# Patient Record
Sex: FEMALE | URBAN - METROPOLITAN AREA
[De-identification: names, ages, dates, MRNs, and addresses within clinical notes are randomized per-mention and may not be internally consistent; named-entity substitution may affect disease eponyms.]

---

## 2017-05-26 ENCOUNTER — HISTORICAL (OUTPATIENT)
Dept: ADMINISTRATIVE | Facility: HOSPITAL | Age: 77
End: 2017-05-26

## 2017-05-26 LAB
ABS NEUT (OLG): 7.2 X10(3)/MCL
ALBUMIN SERPL-MCNC: 3.4 GM/DL (ref 3.4–5)
ALBUMIN/GLOB SERPL: 1 {RATIO}
ALP SERPL-CCNC: 118 UNIT/L (ref 45–117)
ALT SERPL-CCNC: 56 UNIT/L (ref 13–56)
APTT PPP: 25 SECOND(S) (ref 21–30)
AST SERPL-CCNC: 27 UNIT/L (ref 15–37)
BASOPHILS # BLD AUTO: 0 X10(3)/MCL
BASOPHILS NFR BLD AUTO: 0.2 % (ref 0–1)
BILIRUB SERPL-MCNC: 0.4 MG/DL (ref 0.2–1)
BILIRUBIN DIRECT+TOT PNL SERPL-MCNC: 0.1 MG/DL (ref 0–0.2)
BILIRUBIN DIRECT+TOT PNL SERPL-MCNC: 0.3 MG/DL (ref 0–1)
BNP BLD-MCNC: 155 PG/ML (ref 0–150)
BUN SERPL-MCNC: 29 MG/DL (ref 7–18)
CALCIUM SERPL-MCNC: 8.6 MG/DL (ref 8.5–10.1)
CHLORIDE SERPL-SCNC: 108 MMOL/L (ref 98–107)
CO2 SERPL-SCNC: 31 MMOL/L (ref 21–32)
CREAT SERPL-MCNC: 1.09 MG/DL (ref 0.55–1.02)
EOSINOPHIL # BLD AUTO: 0.3 X10(3)/MCL
EOSINOPHIL NFR BLD AUTO: 2.7 % (ref 0–6.4)
ERYTHROCYTE [DISTWIDTH] IN BLOOD BY AUTOMATED COUNT: 13.3 % (ref 11.5–14.8)
GLOBULIN SER-MCNC: 4 GM/DL (ref 2–4)
GLUCOSE SERPL-MCNC: 84 MG/DL (ref 74–106)
HCT VFR BLD AUTO: 43.6 % (ref 35.5–44.6)
HGB BLD-MCNC: 14.9 GM/DL (ref 12.1–15.4)
INR PPP: 0.9
LACTATE SERPL-SCNC: 0.9 MMOL/L (ref 0.4–2)
LYMPHOCYTES # BLD AUTO: 3.1 X10(3)/MCL
LYMPHOCYTES NFR BLD AUTO: 26.9 % (ref 16–44)
MCH RBC QN AUTO: 31.3 PG (ref 28.5–33.8)
MCHC RBC AUTO-ENTMCNC: 34.2 % (ref 33–37)
MCV RBC AUTO: 91.6 FL (ref 82–99)
MONOCYTES # BLD AUTO: 0.8 X10(3)/MCL
MONOCYTES NFR BLD AUTO: 6.8 % (ref 4–12.1)
NEUTROPHILS # BLD AUTO: 7.2 X10(3)/MCL
NEUTROPHILS NFR BLD AUTO: 62.4 % (ref 43–73)
PLATELET # BLD AUTO: 158 X10(3)/MCL (ref 136–369)
PMV BLD AUTO: 9.9 FL (ref 7.4–10.4)
POTASSIUM SERPL-SCNC: 4.2 MMOL/L (ref 3.5–5.1)
PROT SERPL-MCNC: 7.1 GM/DL (ref 6.4–8.2)
PROTHROMBIN TIME: 9.7 SECOND(S) (ref 9.8–12)
RBC # BLD AUTO: 4.76 X10(6)/MCL (ref 4–5.5)
SODIUM SERPL-SCNC: 145 MMOL/L (ref 136–145)
TROPONIN I SERPL-MCNC: 0.04 NG/ML (ref 0–0.04)
WBC # SPEC AUTO: 11.5 X10(3)/MCL (ref 4–10)

## 2017-05-27 LAB
AT III ACT/NOR PPP CHRO: 114 % (ref 82–139)
CANCER AG19-9 SERPL-ACNC: 6 IU/ML (ref 0–35)
CEA SERPL-MCNC: 1 NG/ML (ref 0–3)
CK MB SERPL-MCNC: 1.9 NG/ML (ref 0.5–3.6)
CK MB SERPL-MCNC: 2.2 NG/ML (ref 0.5–3.6)
CK MB SERPL-MCNC: 2.2 NG/ML (ref 0.5–3.6)
PHOSPHOLIPID AB COM-LC: NORMAL
PHOSPHOLIPID AB INT-LC: NORMAL
TROPONIN I SERPL-MCNC: 0.03 NG/ML (ref 0–0.04)
TROPONIN I SERPL-MCNC: 0.03 NG/ML (ref 0–0.04)
TROPONIN I SERPL-MCNC: 0.04 NG/ML (ref 0–0.04)

## 2017-05-28 LAB
ABS NEUT (OLG): 7.25 X10(3)/MCL (ref 2.1–9.2)
ALBUMIN SERPL-MCNC: 2.8 GM/DL (ref 3.4–5)
ALBUMIN/GLOB SERPL: 0.8 {RATIO}
ALP SERPL-CCNC: 97 UNIT/L (ref 45–117)
ALT SERPL-CCNC: 32 UNIT/L (ref 13–56)
AST SERPL-CCNC: 17 UNIT/L (ref 15–37)
BASOPHILS # BLD AUTO: 0 X10(3)/MCL (ref 0–0.2)
BASOPHILS NFR BLD AUTO: 0 %
BILIRUB SERPL-MCNC: 0.5 MG/DL (ref 0.2–1)
BILIRUBIN DIRECT+TOT PNL SERPL-MCNC: 0.1 MG/DL (ref 0–0.2)
BILIRUBIN DIRECT+TOT PNL SERPL-MCNC: 0.4 MG/DL (ref 0–1)
BUN SERPL-MCNC: 22 MG/DL (ref 7–18)
CALCIUM SERPL-MCNC: 8.2 MG/DL (ref 8.5–10.1)
CHLORIDE SERPL-SCNC: 108 MMOL/L (ref 98–107)
CO2 SERPL-SCNC: 26 MMOL/L (ref 21–32)
CREAT SERPL-MCNC: 1.12 MG/DL (ref 0.55–1.02)
EOSINOPHIL # BLD AUTO: 0.2 X10(3)/MCL (ref 0–0.9)
EOSINOPHIL NFR BLD AUTO: 2 %
ERYTHROCYTE [DISTWIDTH] IN BLOOD BY AUTOMATED COUNT: 13.3 % (ref 11.5–17)
GLOBULIN SER-MCNC: 3.5 GM/DL (ref 2.4–3.5)
GLUCOSE SERPL-MCNC: 111 MG/DL (ref 74–106)
HCT VFR BLD AUTO: 38 % (ref 37–47)
HGB BLD-MCNC: 12.4 GM/DL (ref 12–16)
INR PPP: 0.97
LYMPHOCYTES # BLD AUTO: 1.5 X10(3)/MCL (ref 0.6–4.6)
LYMPHOCYTES NFR BLD AUTO: 15 %
MCH RBC QN AUTO: 30.8 PG (ref 27–31)
MCHC RBC AUTO-ENTMCNC: 32.6 GM/DL (ref 33–36)
MCV RBC AUTO: 94.5 FL (ref 80–94)
MONOCYTES # BLD AUTO: 1 X10(3)/MCL (ref 0.1–1.3)
MONOCYTES NFR BLD AUTO: 10 %
NEUTROPHILS # BLD AUTO: 7.25 X10(3)/MCL (ref 2.1–9.2)
NEUTROPHILS NFR BLD AUTO: 72 %
PLATELET # BLD AUTO: 130 X10(3)/MCL (ref 130–400)
PMV BLD AUTO: 10.2 FL (ref 9.4–12.4)
POTASSIUM SERPL-SCNC: 4.1 MMOL/L (ref 3.5–5.1)
PROT SERPL-MCNC: 6.3 GM/DL (ref 6.4–8.2)
PROTHROMBIN TIME: 10.6 SECOND(S) (ref 9.8–12)
RBC # BLD AUTO: 4.02 X10(6)/MCL (ref 4.2–5.4)
SODIUM SERPL-SCNC: 141 MMOL/L (ref 136–145)
WBC # SPEC AUTO: 10 X10(3)/MCL (ref 4.5–11.5)

## 2017-05-29 LAB
BUN SERPL-MCNC: 23 MG/DL (ref 7–18)
CALCIUM SERPL-MCNC: 8.3 MG/DL (ref 8.5–10.1)
CHLORIDE SERPL-SCNC: 106 MMOL/L (ref 98–107)
CO2 SERPL-SCNC: 26 MMOL/L (ref 21–32)
CREAT SERPL-MCNC: 1.22 MG/DL (ref 0.55–1.02)
GLUCOSE SERPL-MCNC: 115 MG/DL (ref 74–106)
INR PPP: 1.19 (ref 0–1.27)
POTASSIUM SERPL-SCNC: 4.3 MMOL/L (ref 3.5–5.1)
PROTHROMBIN TIME: 14.9 SECOND(S) (ref 12.1–14.2)
SODIUM SERPL-SCNC: 138 MMOL/L (ref 136–145)

## 2017-05-30 LAB
INR PPP: 1.19 (ref 0–1.27)
PROTHROMBIN TIME: 14.9 SECOND(S) (ref 12.1–14.2)

## 2017-05-31 LAB
INR PPP: 1.68
PROTHROMBIN TIME: 17.5 SECOND(S) (ref 12.1–14.2)

## 2017-06-01 LAB
BUN SERPL-MCNC: 25 MG/DL (ref 7–18)
CALCIUM SERPL-MCNC: 8.2 MG/DL (ref 8.5–10.1)
CHLORIDE SERPL-SCNC: 110 MMOL/L (ref 98–107)
CO2 SERPL-SCNC: 28 MMOL/L (ref 21–32)
CREAT SERPL-MCNC: 0.87 MG/DL (ref 0.55–1.02)
ERYTHROCYTE [DISTWIDTH] IN BLOOD BY AUTOMATED COUNT: 13.1 % (ref 11.5–17)
GLUCOSE SERPL-MCNC: 118 MG/DL (ref 74–106)
HCT VFR BLD AUTO: 35.5 % (ref 37–47)
HGB BLD-MCNC: 11.4 GM/DL (ref 12–16)
INR PPP: 2
MCH RBC QN AUTO: 30.2 PG (ref 27–31)
MCHC RBC AUTO-ENTMCNC: 32.1 GM/DL (ref 33–36)
MCV RBC AUTO: 93.9 FL (ref 80–94)
PLATELET # BLD AUTO: 201 X10(3)/MCL (ref 130–400)
PMV BLD AUTO: 10.3 FL (ref 9.4–12.4)
POTASSIUM SERPL-SCNC: 4.8 MMOL/L (ref 3.5–5.1)
PROTHROMBIN TIME: 20.2 SECOND(S) (ref 9.8–12)
RBC # BLD AUTO: 3.78 X10(6)/MCL (ref 4.2–5.4)
SODIUM SERPL-SCNC: 145 MMOL/L (ref 136–145)
WBC # SPEC AUTO: 8.9 X10(3)/MCL (ref 4.5–11.5)

## 2017-06-02 LAB
INR PPP: 1.6
PROTHROMBIN TIME: 16.6 SECOND(S) (ref 9.8–12)

## 2017-06-03 LAB
ERYTHROCYTE [DISTWIDTH] IN BLOOD BY AUTOMATED COUNT: 13.4 % (ref 11.5–17)
HCT VFR BLD AUTO: 32.4 % (ref 37–47)
HGB BLD-MCNC: 10.4 GM/DL (ref 12–16)
INR PPP: 1.5 (ref 0–1.27)
MCH RBC QN AUTO: 30.3 PG (ref 27–31)
MCHC RBC AUTO-ENTMCNC: 32.1 GM/DL (ref 33–36)
MCV RBC AUTO: 94.5 FL (ref 80–94)
PLATELET # BLD AUTO: 197 X10(3)/MCL (ref 130–400)
PMV BLD AUTO: 9.9 FL (ref 9.4–12.4)
PROTHROMBIN TIME: 16.1 SECOND(S) (ref 12.1–14.2)
RBC # BLD AUTO: 3.43 X10(6)/MCL (ref 4.2–5.4)
WBC # SPEC AUTO: 7.7 X10(3)/MCL (ref 4.5–11.5)

## 2019-01-17 ENCOUNTER — HISTORICAL (OUTPATIENT)
Dept: ADMINISTRATIVE | Facility: HOSPITAL | Age: 79
End: 2019-01-17

## 2022-04-30 NOTE — ED PROVIDER NOTES
Patient:   Ko Mcbride            MRN: [id number]            FIN: [id number]               Age:   76 years     Sex:  Female     :  1940   Associated Diagnoses:   Acute dyspnea; Bilateral pulmonary embolism; Acute chest pain; CAP (community acquired pneumonia)   Author:   Genet Holder MD      Basic Information   History source: Patient.   Arrival mode: Private vehicle.   History limitation: None.   Additional information: Patient's physician(s): Dr Bryan, PMD, Dr Borrego, Cardiologist.      History of Present Illness   The patient presents with chest pain.  The onset was 3  hours ago.  The course/duration of symptoms is constant.  Location: Left substernal chest. Radiating pain: left lateral rib cage. The character of symptoms is tightness.  The degree at onset was moderate.  The degree at maximum was moderate.  The degree at present is moderate.  There are exacerbating factors including movement, breathing and coughing.  The relieving factor is none.  Risk factors consist of hypertension and not smoking.  Prior episodes: none.  Therapy today Amoxil.  Associated symptoms: shortness of breath, anxiety and Wheezing.  Additional history: Dx with pneumonia last week by Dr Bryan, put on Amoxil and Zpack, will finish Amoxil tomorrow.  Tonight started having chest pain.  Has an appt with Dr Borrego on  for an Echo and Pet scan and some other test.  No risk factors of DVT and PE..        Review of Systems   Respiratory symptoms:  Shortness of breath.   Cardiovascular symptoms:  Chest pain.             Additional review of systems information: All other systems reviewed and otherwise negative.      Health Status   Allergies:    Allergic Reactions (Selected)  No Known Allergies,    Allergies (1) Active Reaction  No Known Allergies None Documented  .   Medications:  (Selected)   Inpatient Medications  Pending Complete  midazolam: 2 mg, form: Injection, IV Push, q5min, Order duration: 2 dose(s),  first dose 05/25/16 8:12:00 CDT, stop date 05/25/16 8:21:00 CDT, (up to 5 mg for moderate anxiety), 30,025  Documented Medications  Documented  SIMVASTATIN 40 MG TABLET: 40 mg = 1 tab(s), Oral, qPM  aspirin 81 mg oral tablet: 81 mg = 1 tab(s), Oral, Daily, # 30 tab(s), 0 Refill(s)  escitalopram 10 mg oral tablet: 10 mg = 1 tab(s), Oral, Daily, # 30 tab(s), 0 Refill(s)  hydrochlorothiazide 25 mg oral tablet: 25 mg = 1 tab(s), Oral, Daily, # 30 tab(s), 0 Refill(s)  levothyroxine 88 mcg (0.088 mg) oral tablet: 88 mcg = 1 tab(s), Oral, Daily, # 30 tab(s), 0 Refill(s)  lisinopril 40 mg oral tablet: 40 mg = 1 tab(s), Oral, Daily, # 30 tab(s), 0 Refill(s)  simvastatin 20 mg oral tablet: 20 mg = 1 tab(s), Oral, Once a day (at bedtime), # 30 tab(s), 0 Refill(s).      Past Medical/ Family/ Social History   Medical history: Negative.   Surgical history:    Carpal Tunnel Release (Right) on 5/25/2016 at 75 Years.  Comments:  5/25/2016 10:15 - Val Monterroso RN  auto-populated from documented surgical case  Release Trigger Finger Or Thumb (Right) on 5/25/2016 at 75 Years.  Comments:  5/25/2016 10:15 - Val Monterroso RN  auto-populated from documented surgical case  bunionectomy.  Comments:  5/17/2016 12:45 - Lora CUADRA, [facility]  bilateral  vein stripping., Reviewed as documented in chart.   Family history:    Arthritis  Mother  Alcoholism.  Father  .   Social history: Tobacco use: Denies.   Problem list:    Active Problems (8)  Depression   Exercise tolerance finding   HTN - Hypertension   Hypercholesterolemia   Numbness and tingling   Numbness of hand   Thyroid disease   Trigger finger of right hand   , per nurse's notes.      Physical Examination               Vital Signs      Vital Signs (last 24 hrs)_____  Last Charted___________  Temp Oral     36.9 DegC  (MAY 26 21:56)  Heart Rate Peripheral   86 bpm  (MAY 26 21:56)  Resp Rate         18 br/min  (MAY 26 21:56)  SBP      H 185mmHg  (MAY 26 21:56)  DBP      87 mmHg  (MAY 26  21:56)  SpO2      96 %  (MAY 26 21:56)  Height      162 cm  (MAY 26 21:56)  .   Oxygen saturation.   General:  Alert.   Skin:  Warm, dry, pink, intact.    Eye:  Pupils are equal, round and reactive to light.   Ears, nose, mouth and throat:  Oral mucosa moist.   Neck:  Supple, no JVD.    Cardiovascular:  Regular rate and rhythm.   Respiratory:  Lungs are clear to auscultation.   Gastrointestinal:  Soft, Nontender.    Musculoskeletal:  Normal ROM, normal strength, no tenderness, no swelling, no deformity, No sign of DVT.    Neurological:  Alert and oriented to person, place, time, and situation.   Psychiatric:  Cooperative.      Medical Decision Making   Rationale:  Ms. Mcbride has massive bilateral pulmonary embolism with patchy infiltrates in the lingula which could represent the residual pneumonia.  Results for discussed and she understands results and reason for admission.  I we will put her on Levaquin to cover for residual pneumonia and we will give her Lovenox to treat the pulmonary embolism.  We will get bilateral lower extremity venous ultrasounds to evaluate for DVT in her legs.  She states she is very active in the only risk factor for PE is her recent diagnosis of pneumonia so perhaps the PE as a complication of her recent illness.   Documents reviewed:  Emergency department nurses' notes.   Orders  Launch Orders   Laboratory:  Troponin-I (Order): Stat collect, 5/26/2017 21:57 CDT, Blood, Lab Collect, 5/26/2017 21:57 CDT  CMP (Order): Stat collect, 5/26/2017 21:57 CDT, Blood, Lab Collect, 5/26/2017 21:57 CDT  CBC w/ Auto Diff (Order): Stat collect, 5/26/2017 21:57 CDT, Blood, Lab Collect, 5/26/2017 21:57 CDT  Patient Care:  Saline Lock Insert (Order): 5/26/2017 21:57 CDT  Contact MD for order for Aspirin and NTG. (Order): 5/26/2017 21:57 CDT, Contact MD for order for Aspirin and NTG.  Pulse Oximetry (Order): 5/26/2017 21:57 CDT, Place on pulse oximetry.  Monitor oxygen saturation.  Cardiac Monitoring  (Order): 5/26/2017 21:57 CDT, Constant Order, Place on telemetry.  Blood Pressure (Order): 5/26/2017 21:57 CDT, Monitor blood pressure.  Radiology:  XR Chest 1 View (Order): Stat, 5/26/2017 21:57 CDT, Chest Pain, None, Stretcher, Patient Has IV?, Rad Type  Cardiology:  EKG Adult 12 Lead (Order): 5/26/2017 21:57 CDT, Stat, Chest Pain, Stretcher, Patient Has IV, Standard Precautions, Show to provider upon completion., -1, -1, 5/26/2017 21:57 CDT, Launch Orders   Admit/Transfer/Discharge:  Admit as Inpatient (Order): 5/27/2017 0:20 CDT, Randi NOVA, [facility] Telemetry with Monitor, No, Launch Orders   Pharmacy:  morphine 2 mg/mL-NaCl 0.9% intravenous solution (Order): 4 mg, IV, q4hr, first dose 5/27/2017 0:23 CDT, 26,051, Launch Orders   Pharmacy:  DuoNeb 0.5 mg-2.5 mg/3 mL inhalation solution (Order): 3 mL, form: Soln, NEB, QID PRN for shortness of breath or wheezing, first dose 5/27/2017 0:49 CDT, Launch Orders   Radiology:  US Extremity Venous Bilateral (Order): Stat, 5/27/2017 7:00 CDT, Other (please specify), None, Stretcher, Patient Has IV?, PE, Rad Type, Schedule this test, [facility].    Electrocardiogram:  Time 5/27/2017 21:58:00, rate 78, normal sinus rhythm, No ST-T changes, no ectopy, normal NC & QRS intervals.    Results review:  Lab results : Lab View   5/26/2017 22:00 CDT      WBC                       11.5 x10(3)/mcL  HI                             RBC                       4.76 x10(6)/mcL                             Hgb                       14.9 gm/dL                             Hct                       43.6 %                             Platelet                  158 x10(3)/mcL                             MCV                       91.6 fL                             MCH                       31.3 pg                             MCHC                      34.2 %                             RDW                       13.3 %                             MPV                       9.9 fL                              Abs Neut                  7.2 x10(3)/mcL  NA                             Neutro Auto               62.4 %                             Lymph Auto                26.9 %                             Mono Auto                 6.8 %                             Eos Auto                  2.7 %                             Abs Eos                   0.3 x10(3)/mcL  NA                             Basophil Auto             0.2 %                             Abs Neutro                7.2 x10(3)/mcL  NA                             Abs Lymph                 3.1 x10(3)/mcL  NA                             Abs Mono                  0.8 x10(3)/mcL  NA                             Abs Baso                  0.0 x10(3)/mcL  NA  ,    No qualifying data available.       Reexamination/ Reevaluation   Time: 5/27/2017 00:20:00 .   Vital signs   results included from flowsheet : Vital Signs   5/27/2017 0:13 CDT       Peripheral Pulse Rate     82 bpm                             Respiratory Rate          17 br/min                             SpO2                      96 %                             Oxygen Therapy            Nasal cannula                             Systolic Blood Pressure   159 mmHg  HI                             Diastolic Blood Pressure  78 mmHg                             Mean Arterial Pressure, Cuff              105 mmHg     Notes: Wheezing has resolved after breathing treatment.  Patient is still pretty uncomfortable but is refusing pain medication at this time.  We will give Lovenox for the pulmonary embolism, Levaquin to cover any residual pneumonia, and she will be admitted to Dr. Bryan..      Impression and Plan   Diagnosis   Acute dyspnea (XGC62-FV R06.00)   Bilateral pulmonary embolism (JVO23-BC I26.99)   Acute chest pain (ZDN96-PN R07.9)   CAP (community acquired pneumonia) (TKO39-NG J18.9)      Calls-Consults   -  5/27/2017 00:19:00 , Randi NOVA, [facility], Admit to telemetry, Lovenox for  anticoagulation, will repeat cardiac enzymes and monitor vital signs..    Plan   Condition: Stable.    Disposition: Admit time  5/27/2017 00:19:00.    Counseled: Patient, Regarding diagnosis, Regarding diagnostic results, Regarding treatment plan, Patient indicated understanding of instructions.

## 2022-04-30 NOTE — H&P
Patient:   Ko Mcbride            MRN: [id number]            FIN: [id number]               Age:   76 years     Sex:  Female     :  1940   Associated Diagnoses:   None   Author:   Terry Bryan MD      Basic Information   Source of history:  Self, Family member, Medical record.    Present at bedside:  Family member.    Referral source:  Emergency department.    History limitation:  None.       Chief Complaint   LEFT  SIDED  CHEST   PAIN   AND  SHORTNESS  OF BREATH       History of Present Illness   The  pt  is  a 77 yo feamle , who is  an ambulatory  and otherwise active  person  came  to the  ER  with  C/O  sudden  onset of left sided chest pain . Pain was  sharp , associated  with shortness of breath, located  to  left lower chest under the left breast, 8/10  intensity , constant , worsens by  deep breathing and  coughing . No associated  nausea  or  diaphoresis   or  lightheadedness . Of  significance  for the  last 10 days  pt has been experiencing  wheezing,  mild  shortness   of breath  on exertion   and  some decrease  in her  usual stamina   and  was getting some what limited   in  her normal activities. These all  are  very new  for  the  patient. She was seen last Friday in the  clinic  for  new onset  wheezing, shortness of breath  on exertion. She  did  report  diaphoresis  with SOB  then. We  initiated treatment   for  lower  respiratory  tract infection  with  oral antibiotic   and  oral  steroid  and  made an appointment  to  see Cardiologit  for  initial  visit  due  to new  onset  shortness of breath  and  diaphoresis.  Pt did  not have   chest  pain then.    Today  she presented  to the  ER  with  new onset  chest pain. Initial  work up include  EKG  , CXR,  and  Cardiac .trpornin  were  unremrakable . But due  to  shortness  of breath  and  pleuritic  chest pain, ER  physician    has ordered  CT  chest  with PE   protocol  and  that revealed  bilateral  extensive  pulmonary  embolism. Pt  in not an active  smoker, not on hormone  repalcement  therpay , no recent  h/o hospitalization or  immobilization , no recent  h/o travel  or    no  h/o  active  malignancy.      Pt is  admitted   for  further  management   bilateral extensive  pulmonary embolism.       Review of Systems   Constitutional:  Weakness.    Eye:  Negative.    Ear/Nose/Mouth/Throat:  Negative.    Respiratory:  Shortness of breath, Wheezing.    Cardiovascular:  Palpitations.    Gastrointestinal:  Nausea.    Genitourinary:  Negative.    Hematology/Lymphatics:  Negative.    Endocrine:  Negative.    Musculoskeletal:  Negative.    Neurologic:  Negative.       Health Status   Allergies:    Allergic Reactions (All)  No Known Allergies   Current medications:  (Selected)   Inpatient Medications  Ordered  Ativan 2mg/mL for IM / IV Push: 0.5 mg, form: Injection, IV, q4hr PRN, first dose 05/27/17 0:53:00 CDT, 26,051  Dilaudid: 0.5 mg, form: Injection, IV Push, q4hr PRN for pain, first dose 05/27/17 11:49:00 CDT, may repeat dose x 1, 26,051  DuoNeb 0.5 mg-2.5 mg/3 mL inhalation solution: 3 mL, form: Soln, NEB, QID PRN for shortness of breath or wheezing, first dose 05/27/17 0:49:00 CDT  Levaquin 500 mg oral tablet: 500 mg, form: Tab, Oral, q24hr, first dose 05/27/17 12:00:00 CDT, 26,045  Lovenox 100mg Inj: 85 mg, form: Injection, Subcutaneous, q12hr, first dose 05/27/17 2:00:00 CDT, 34262  Nitro-Bid 2% transdermal oint: 0.5 in, form: Ointment, TOP, [id number] (6-12-6-12), first dose 05/27/17 6:00:00 CDT  Zofran: 4 mg, form: Injection, IV Push, q4hr PRN for nausea, first dose 05/27/17 1:50:00 CDT, 26,051  amLODIPine: 5 mg, form: Tab, Oral, Daily, first dose 05/28/17 9:00:00 CDT, 23  aspirin 325 mg oral tablet: 325 mg, form: Tab, Oral, Daily, first dose 05/27/17 9:00:00 CDT, 4 chew tab = 5 grain ASA, 23  cloNIDine: 0.1 mg, form: Tab, Oral, q8hr PRN for hypertension, first dose 05/27/17 1:50:00 CDT, SBP > __180  or DBP > 85__,  30,023  levothyroxine 88 mcg (0.088 mg) oral tablet: 88 mcg, form: Tab, Oral, Daily, first dose 05/28/17 6:00:00 CDT, 24,027  nitroglycerin 0.4 mg sublingual TAB: 0.4 mg, form: Tab-SL, SL, q5min PRN for chest pain, Order duration: 3 dose(s), first dose 05/27/17 2:50:00 CDT, stop date Limited # of times, 30,025  simvastatin 40 mg oral tablet: 40 mg, form: Tab, Oral, qPM, first dose 05/27/17 21:00:00 CDT, 26,038  Pending Complete  midazolam: 2 mg, form: Injection, IV Push, q5min, Order duration: 2 dose(s), first dose 05/25/16 8:12:00 CDT, stop date 05/25/16 8:21:00 CDT, (up to 5 mg for moderate anxiety), 30,025  Documented Medications  Documented  SIMVASTATIN 40 MG TABLET: 40 mg = 1 tab(s), Oral, qPM  amoxicillin 875 mg oral tablet: 875 mg = 1 tab(s), Oral, BID, # 14 tab(s), 0 Refill(s)  amoxicillin 875 mg oral tablet: 875 mg = 1 tab(s), Oral, BID, # 20 tab(s), 0 Refill(s)  aspirin 81 mg oral tablet: 81 mg = 1 tab(s), Oral, Daily, # 30 tab(s), 0 Refill(s)  escitalopram 10 mg oral tablet: 10 mg = 1 tab(s), Oral, Daily, # 30 tab(s), 0 Refill(s)  hydrochlorothiazide 25 mg oral tablet: 25 mg = 1 tab(s), Oral, Daily, # 30 tab(s), 0 Refill(s)  levothyroxine 88 mcg (0.088 mg) oral tablet: 88 mcg = 1 tab(s), Oral, Daily, # 30 tab(s), 0 Refill(s)  lisinopril 40 mg oral tablet: 40 mg = 1 tab(s), Oral, Daily, # 30 tab(s), 0 Refill(s)  simvastatin 20 mg oral tablet: 20 mg = 1 tab(s), Oral, Once a day (at bedtime), # 30 tab(s), 0 Refill(s)   Problem list:    All Problems  Depression / SNOMED CT 852840307 / Confirmed  Exercise tolerance finding / SNOMED CT 033408095 / Confirmed  HTN - Hypertension / SNOMED CT 5090946518 / Confirmed  Hypercholesterolemia / SNOMED CT 98293438 / Confirmed  Numbness and tingling / SNOMED CT [id number] / Confirmed  right  Numbness of hand / SNOMED CT 005514060 / Confirmed  Thyroid disease / SNOMED CT 197900193 / Confirmed  Trigger finger of right hand / SNOMED CT  032695074601888 / Confirmed  3rd and 4th finger right,    Active Problems (8)  Depression   Exercise tolerance finding   HTN - Hypertension   Hypercholesterolemia   Numbness and tingling   Numbness of hand   Thyroid disease   Trigger finger of right hand         Histories   Past Medical History:    No active or resolved past medical history items have been selected or recorded.   Family History:    Arthritis  Mother  Alcoholism.  Father     Procedure history:    Carpal Tunnel Release (Right) on 5/25/2016 at 75 Years.  Comments:  5/25/2016 10:15 - Val Monterroso RN  auto-populated from documented surgical case  Release Trigger Finger Or Thumb (Right) on 5/25/2016 at 75 Years.  Comments:  5/25/2016 10:15 - Val Monterroso RN  auto-populated from documented surgical case  bunionectomy.  Comments:  5/17/2016 12:45 - Zayra Paulino RN  bilateral  vein stripping.  Face lift ([id number]).   Social History        Social & Psychosocial Habits    Alcohol  05/17/2016  Type: Wine    Frequency: 1-2 times per month    Substance Abuse  06/09/2016  Use: Never    Tobacco    Comment: pt denies - 05/17/2016 12:42 - Zayra Paulino RN  .        Physical Examination   General:  Alert and oriented, No acute distress.    Eye:  Pupils are equal, round and reactive to light.    HENT:  Normocephalic, Oral mucosa is moist.    Neck:  Supple, No jugular venous distention.    Respiratory:  Respirations are non-labored, No chest wall tenderness, crackles  at   left base.    Cardiovascular:  Normal rate, Good pulses equal in all extremities, No edema.    Gastrointestinal:  Soft, Non-distended, Normal bowel sounds.       Vital Signs (last 24 hrs)_____  Last Charted___________  Temp Oral     36.3 DegC  (MAY 27 07:00)  Heart Rate Peripheral   76 bpm  (MAY 27 05:00)  Resp Rate         18 br/min  (MAY 27 07:00)  SBP      97 mmHg  (MAY 27 07:00)  DBP      L 52mmHg  (MAY 27 07:00)  SpO2      95 %  (MAY 27 07:00)  Height      162 cm  (MAY 26 21:56)      Genitourinary:  No costovertebral angle tenderness.    Musculoskeletal:  Normal range of motion.    Neurologic:  Alert, Cranial Nerves II-XII are grossly intact, Normal deep tendon reflexes.       Review / Management   Results review:     Labs (Last four charted values)  Glucose              84 (MAY 26)   PT                   L 9.7 (MAY 26)   INR                  0.9 (MAY 26)   PTT                  25 (MAY 26) .    Radiology results   Rad Results (ST)   Accession: [id number]  Order: US Extremity Venous Bilateral  Report Dt/Tm: 05/27/2017 10:48  Report:   History  Dyspnea.     Reference Study  None available.     Findings  Sonographic images with color and spectral analysis were obtained of  the bilateral lower extremity venous system. Thrombus is seen within  one of the right peroneal veins. Other imaged lower extremity veins  demonstrate normal flow, compressibility, and augmentation without  evidence of thrombus.     Impression  Thrombus within a right peroneal vein. No thrombus identified above  the knees.  Patient has known pulmonary thromboembolic disease.      Accession: [id number]  Order: CT Thorax PE Protocol  Report Dt/Tm: 05/27/2017 09:45  Report:      History  Epigastric chest pain. Pneumonia.     Reference  None available.     Technique  Helical acquisition through the chest with IV contrast targeting the  pulmonary arteries. Multiplanar and 3D MIP reconstructed images were  provided for review. DLP 1084 mGycm. Automatic exposure control,  adjustment of mA/kV or iterative reconstruction technique was used to  reduce radiation.     Findings  There is good opacification of the pulmonary arterial tree. Extensive  pulmonary thromboembolic disease beginning distal right and left main  pulmonary arteries, saddle configuration. Question right heart strain.  RV LV ratio is around 1.2. No aortic dissection.     There is no mediastinal, hilar or axillary lymphadenopathy by size  criteria. Heart is normal in  size. Aorta and pulmonary artery are  normal in caliber. No pericardial effusion.     Trace left pleural effusion. There are some peripheral opacities in  the left lung, possibly infectious or inflammatory but early pulmonary  infarcts possible.     The imaged upper abdomen is unremarkable. Mild degenerative changes of  the spine.     Impression  Extensive bilateral pulmonary thromboembolic disease. Question right  heart strain.   Peripheral opacities in left lung may represent early developing  pulmonary infarcts. Trace left effusion.     No significant discrepancy with the [facility]  report.      Accession: [id number]  Order: XR Chest 1 View  Report Dt/Tm: 05/27/2017 09:12  Report:      CHEST X-RAY (AP view):     CPT 24441     HISTORY:  Chest Pain     FINDINGS:  Examination reveals mediastinal and cardiac silhouettes to be within  normal limits. Lung fields are clear and free of gross infiltrates  atelectases or effusions     IMPRESSION: No active pulmonary disease            Impression and Plan   1) Extensive   bilateral  pulmonary  embolism - Pt is  currently hemodynamically   stable   and   no respiratory compromise noted  at this  time. We  will  treat her  with  LMWH  1 mg/kg  subQ  q 12h. We  will monitor  her in telemetry . This  is  an unprovoked  thromboembolic  case  with no  apparent  risk factors  . I  will initiate  thrombophilia  / hypercoagulability  work up . Her general   cancer  screening  test  including  mammogram  and  colonoscopy  are   all  up to date   and  has no h/o  active  cancer.  Cardiology service   is  consulted and we  will follow   their  recommendations.     Possible  lower  respiratory tract  infection-  Pt has  completed  10 days  of  Oral  Augmentin  treatmnet     HTN-   Monitor  vitals  . Resume  home meds once  appropriate     Hypothyroidism  -  continue  home meds

## 2022-04-30 NOTE — DISCHARGE SUMMARY
Patient:   Ko Mcbride            MRN: [id number]            FIN: [id number]               Age:   76 years     Sex:  Female     :  1940   Associated Diagnoses:   Acute chest pain; Acute dyspnea; Bilateral pulmonary embolism; Acute respiratory infection; Chest pain - Pleuritic; Deep vein thrombosis (DVT) of right lower extremity associated with air travel   Author:   Terry Bryan MD      Results Review   General results   Most recent results   Discrete results only : ALLSRVSECTS   6/3/2017 4:42 CDT        PT                        16.1 second(s)  HI                             INR                       1.50  HI                             WBC                       7.7 x10(3)/mcL                             RBC                       3.43 x10(6)/mcL  LOW                             Hgb                       10.4 gm/dL  LOW                             Hct                       32.4 %  LOW                             Platelet                  197 x10(3)/mcL                             MCV                       94.5 fL  HI                             MCH                       30.3 pg                             MCHC                      32.1 gm/dL  LOW                             RDW                       13.4 %                             MPV                       9.9 fL           Discharge Information   Discharge Summary Information     Admitted  2017.     Discharged  6/3/2017.     Consulting physician: Kishore Borrego MD     Acute chest pain (JAH05-WW R07.9).     Acute dyspnea (QCV26-QK R06.00).     Bilateral pulmonary embolism (VPD41-TT I26.99).     Acute respiratory infection (RNB56-US J22).     Chest pain - Pleuritic (PNED [id number]).     Deep vein thrombosis (DVT) of right lower extremity associated with air travel (LCD35-TH I82.401).        Physical Examination   Vital signs   BP: within normal limits.     Pulse: within normal limits.     Resp: within normal  limits.     General   Alert and oriented X3.     No acute distress.     Cardiovascular   Within normal limits.     Respiratory   Within normal limits.     Gastrointestinal   Within normal limits.     Upper extremity musculoskeletal   Within normal limits.     Lower extremity musculoskeletal   Within normal limits.     Neurologic   Alert and oriented.        Hospital Course   The  pt was  admitted    to the  hospital  for  evaluation   of  sudden  onset  of  left lower  chest pain  which was pleuritic  in nature  associated    with   shortness  of breath. Prior  to the  admission pt was  receiving  oral antibiotic  for  lower  respiratory  infection.Due   to pt's  c/o  acute  onset   of shortness  of  breath   and  pleuritic   chest pain, a CT  angio  of chest   was done  by ER  physician  which  had revealed   bilateral   extensive   pulmonary  embolism   with  mild  Rt heart   starin. Subsequently   doppler   venou  U/S   of shireen lower  ext  was done  and   showed  Rt   distal   deep  vein thrombosis  assumed   to be  the  remaining   thrombosis    and  source   of  pulnary  embolism.  She was  initially  started  on full dose  of Lovenox.  She was  otherwise  hemodynamically stable   and   there was  no respiratory  compromise.  We  bridged  her  to  coumadin  but it   was taking long to bring the  INR  therapeutic.  She was  alos  treated  for  lower    resp  tract   infection  with  oral  Levaquin. Her hospital course  was innitially  complicated   by persistent   pleuritic  chest  pain   . We  treated   her  with  short  courses   of  IV  solumedrol,  Nebulizer  treatment .     Due  to  unprvoked  thrmoboembolism, we  initiated  hypercoaguability   work up   and   cancer  screening  test.  She was noted  to have  positive  lupus  anticoagulant  . Other   tests  were  negative.      We  decided   to  start  pt on Apixaban   when   INR   was  less than 2.0  afetr  we  contacted  her insurance   and  price  of the   medicine  deemed  reasonable .    She  was  discharged   home  in stable   condition  on 6/3/17.       Discharge Plan   Discharge Summary Plan   Discharge Status: stable.     Discharge Disposition: discharge to home into the care of family member.     Prescriptions: reviewed with patient.

## 2024-07-03 ENCOUNTER — LAB VISIT (OUTPATIENT)
Dept: LAB | Facility: HOSPITAL | Age: 84
End: 2024-07-03
Attending: INTERNAL MEDICINE
Payer: MEDICARE

## 2024-07-03 DIAGNOSIS — I10 ESSENTIAL HYPERTENSION, MALIGNANT: Primary | ICD-10-CM

## 2024-07-03 DIAGNOSIS — E86.0 DEHYDRATION: ICD-10-CM

## 2024-07-03 DIAGNOSIS — E03.1 CONGENITAL HYPOTHYROIDISM: ICD-10-CM

## 2024-07-03 LAB
ANION GAP SERPL CALC-SCNC: 9 MEQ/L
BUN SERPL-MCNC: 25.6 MG/DL (ref 9.8–20.1)
CALCIUM SERPL-MCNC: 10.1 MG/DL (ref 8.4–10.2)
CHLORIDE SERPL-SCNC: 108 MMOL/L (ref 98–107)
CO2 SERPL-SCNC: 25 MMOL/L (ref 23–31)
CREAT SERPL-MCNC: 1.24 MG/DL (ref 0.55–1.02)
CREAT/UREA NIT SERPL: 21
GFR SERPLBLD CREATININE-BSD FMLA CKD-EPI: 43 ML/MIN/1.73/M2
GLUCOSE SERPL-MCNC: 89 MG/DL (ref 82–115)
POTASSIUM SERPL-SCNC: 4.4 MMOL/L (ref 3.5–5.1)
SODIUM SERPL-SCNC: 142 MMOL/L (ref 136–145)
TSH SERPL-ACNC: 1.3 UIU/ML (ref 0.35–4.94)

## 2024-07-03 PROCEDURE — 84443 ASSAY THYROID STIM HORMONE: CPT

## 2024-07-03 PROCEDURE — 80048 BASIC METABOLIC PNL TOTAL CA: CPT

## 2024-07-03 PROCEDURE — 36415 COLL VENOUS BLD VENIPUNCTURE: CPT
